# Patient Record
(demographics unavailable — no encounter records)

---

## 2024-10-30 NOTE — PHYSICAL EXAM
[Sclera] : the sclera and conjunctiva were normal [General Appearance - Alert] : alert [PERRL With Normal Accommodation] : pupils were equal in size, round, and reactive to light [Extraocular Movements] : extraocular movements were intact [Neck Appearance] : the appearance of the neck was normal [Exaggerated Use Of Accessory Muscles For Inspiration] : no accessory muscle use [Heart Sounds] : normal S1 and S2 [Abdomen Soft] : soft [Edema] : there was no peripheral edema [Abdomen Tenderness] : non-tender [] : no hepato-splenomegaly [Abdomen Mass (___ Cm)] : no abdominal mass palpated [Abdomen Hernia] : no hernia was discovered [Skin Color & Pigmentation] : normal skin color and pigmentation [Oriented To Time, Place, And Person] : oriented to person, place, and time

## 2024-10-30 NOTE — HISTORY OF PRESENT ILLNESS
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   Chart reviewed. No recent labs on [employer]. Last abdominal imaging on file from 2/2022 and showing 2 cysts in hepatic segment 2 measuring up to 2.3 cm and numerous other subcentimeter hypodense hepatic lesions. PET scan from 1/31/24 unremarkable. S/p BM biopsy of T1 hypolucent lesion; Bx not with patient. 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 6/13/24 labs remarkable for elevated AST (44), ALT (49), normal total bili (0.6), elevated K/L ratio (1.92), HBV cAb NR, HBV VL NR, HAV IgM NR, HCV ab NR, HBV sAg NR, HBV core IgM NR, and HBV sAb NR. Since then, 8/2024 labs show liver tests normalized to alk phos 100, AST 31, and ALT 28. No recent abdominal imaging.    Today, patient reports feeling well. He had a UTI in 5/2024, for which he received Abx (unknown which one). Otherwise, no fevers, chills, jaundice, N/V, CP, SOB, abdominal pain, or changes in urination or BM habits. Med list reconciled. Upon 24 hr diet recollection, he reported the following: breakfast - cereal (raisin bran), banana; lunch - sandwich, cottage cheese, peanut butter; dinner - eating out / [employer] (salmon, beans and rice). Now trying to cook more at home for dinner and put less Na. For beverages, he drinks mostly water. For exercise, he walks 0.5 miles x 5 days/week and does sit ups and push ups every day. His family hx is significant for breast cancer and pancreatic cancer in the mother and colorectal cancer in the father. No FHx of liver disease or autoimmune conditions.   SHx - home: lives at home with wife - work: retail clothing salesman but now retired - EtOH: 4 times per year (1-2 cans of beer) - tobacco: former smoker  - drugs: none   Last colonoscopy: done in late 2023 / early 2024. Per patient's report, one polyp found and removed, found to be noncancerous. Repeat due in 1 year.

## 2024-10-30 NOTE — ASSESSMENT
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   #Elevated liver tests - resolved #Recent Abx use #Hx of HTN, HLD, pre-DM #Hx of MGUS Patient with recent use of Abx and metabolic risk factors p/w mildly elevated liver tests in 6/2024 that now have normalized. Alk phos never elevated. Given temporal association between Abx use and subsequent liver test elevation that now has resolved, patient's presentation likely 2/2 mild DILI from Abx. In addition, given presence of multiple metabolic risk factors, patient is at risk of MAFLD and since FIB-4 is 2.29 with age > 65, patient is indicated for secondary risk assessment with fibroscan. Amyloid was considered but less likely given alk phos not elevated - since amyloid is an infiltrative disease, it would present with alk phos elevation.   Plan: - obtain baseline labs with CBC, CMP, INR, A1c, and lipid profile - US abdomen for baseline  - RTC in 6 months for Fibroscan  Matthew Carvajal, PGY-4 GI/Hep fellow

## 2024-10-30 NOTE — END OF VISIT
[] : Fellow [FreeTextEntry3] : I saw and evaluated this patient with the fellow and agree with the fellow's findings and plan of care as documented by the fellow or edited by me.  77M with elevated liver tests Metabolic risk factors Had taken ABX around that time Most likely related to DILI as labs resolved now  I do not think there is hepatic amyloid at this time  Will obtain baseline labs at [facility] and baseline US now  RPA 3-6M with Fibroscan - encouraging better control of metabolic risk factors  Counseled on natural hx of metabolic associated steatotic liver disease (MASLD) Counseled on diet - limiting carbs and increasing protein and vegetable intake 30min exercise daily Goal 7-10% weight loss Discussed concern of disease progression to MASH and more advanced fibrosis as well

## 2025-07-29 NOTE — ASSESSMENT
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   #Elevated liver tests - resolved #Recent Abx use #Hx of HTN, HLD, pre-DM #Hx of MGUS Patient with recent use of Abx and metabolic risk factors p/w mildly elevated liver tests in 6/2024 that now have normalized. Alk phos never elevated. Given temporal association between Abx use and subsequent liver test elevation that now has resolved, patient's presentation likely 2/2 mild DILI from Abx. In addition, given presence of multiple metabolic risk factors, patient is at risk of MAFLD and since FIB-4 is 2.29 with age > 65, patient is indicated for secondary risk assessment with fibroscan. Amyloid was considered but less likely given alk phos not elevated - since amyloid is an infiltrative disease, it would present with alk phos elevation.   - MRI done in 2/2025  Normal morphology. Numerous bilobar hepatic cysts, the largest measuring 2.5 cm.  - Fibroscan done in 7/29/2025 F0 S1  - Normal LFTs reviewed done in 12/2024.   No concerns for advanced fibrosis or infiltrative disease.  Discussed healthy diet and exercise. Recommend a heart healthy diet.   RTC as needed.

## 2025-07-29 NOTE — PHYSICAL EXAM
[General Appearance - Alert] : alert [Sclera] : the sclera and conjunctiva were normal [PERRL With Normal Accommodation] : pupils were equal in size, round, and reactive to light [Extraocular Movements] : extraocular movements were intact [Neck Appearance] : the appearance of the neck was normal [Exaggerated Use Of Accessory Muscles For Inspiration] : no accessory muscle use [Heart Sounds] : normal S1 and S2 [Edema] : there was no peripheral edema [Abdomen Soft] : soft [Abdomen Tenderness] : non-tender [] : no hepato-splenomegaly [Abdomen Mass (___ Cm)] : no abdominal mass palpated [Abdomen Hernia] : no hernia was discovered [Skin Color & Pigmentation] : normal skin color and pigmentation [Oriented To Time, Place, And Person] : oriented to person, place, and time

## 2025-07-29 NOTE — HISTORY OF PRESENT ILLNESS
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   Chart reviewed. No recent labs on [employer]. Last abdominal imaging on file from 2/2022 and showing 2 cysts in hepatic segment 2 measuring up to 2.3 cm and numerous other subcentimeter hypodense hepatic lesions. PET scan from 1/31/24 unremarkable. S/p BM biopsy of T1 hypolucent lesion; Bx not with patient. 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 6/13/24 labs remarkable for elevated AST (44), ALT (49), normal total bili (0.6), elevated K/L ratio (1.92), HBV cAb NR, HBV VL NR, HAV IgM NR, HCV ab NR, HBV sAg NR, HBV core IgM NR, and HBV sAb NR. Since then, 8/2024 labs show liver tests normalized to alk phos 100, AST 31, and ALT 28. No recent abdominal imaging.    Today, patient reports feeling well. He had a UTI in 5/2024, for which he received Abx (unknown which one). Otherwise, no fevers, chills, jaundice, N/V, CP, SOB, abdominal pain, or changes in urination or BM habits. Med list reconciled. Upon 24 hr diet recollection, he reported the following: breakfast - cereal (raisin bran), banana; lunch - sandwich, cottage cheese, peanut butter; dinner - eating out / [employer] (salmon, beans and rice). Now trying to cook more at home for dinner and put less Na. For beverages, he drinks mostly water. For exercise, he walks 0.5 miles x 5 days/week and does sit ups and push ups every day. His family hx is significant for breast cancer and pancreatic cancer in the mother and colorectal cancer in the father. No FHx of liver disease or autoimmune conditions.   SHx - home: lives at home with wife - work: retail clothing salesman but now retired - EtOH: 4 times per year (1-2 cans of beer) - tobacco: former smoker  - drugs: none   Last colonoscopy: done in late 2023 / early 2024. Per patient's report, one polyp found and removed, found to be noncancerous. Repeat due in 1 year.   Interval history  Present to clinic for fibroscan and follow up.  Feels overall well with no new complaints.  No new medications  Does have slight elevated BP today reports at home is around 135-140